# Patient Record
Sex: MALE | Race: WHITE | Employment: UNEMPLOYED | URBAN - METROPOLITAN AREA
[De-identification: names, ages, dates, MRNs, and addresses within clinical notes are randomized per-mention and may not be internally consistent; named-entity substitution may affect disease eponyms.]

---

## 2018-01-01 ENCOUNTER — TELEPHONE (OUTPATIENT)
Dept: CARDIOLOGY CLINIC | Age: 48
End: 2018-01-01

## 2018-01-01 ENCOUNTER — HOSPITAL ENCOUNTER (OUTPATIENT)
Dept: CARDIAC REHAB | Age: 48
Setting detail: THERAPIES SERIES
Discharge: HOME | End: 2018-09-27
Payer: MEDICARE

## 2018-01-01 ENCOUNTER — OFFICE VISIT (OUTPATIENT)
Dept: CARDIOLOGY CLINIC | Age: 48
End: 2018-01-01

## 2018-01-01 ENCOUNTER — HOSPITAL ENCOUNTER (OUTPATIENT)
Dept: OTHER | Age: 48
Discharge: HOME | End: 2018-09-05
Attending: INTERNAL MEDICINE | Admitting: INTERNAL MEDICINE

## 2018-01-01 VITALS
SYSTOLIC BLOOD PRESSURE: 130 MMHG | HEIGHT: 72 IN | BODY MASS INDEX: 28.55 KG/M2 | OXYGEN SATURATION: 96 % | DIASTOLIC BLOOD PRESSURE: 86 MMHG | WEIGHT: 210.8 LBS | HEART RATE: 93 BPM

## 2018-01-01 DIAGNOSIS — I21.4 NSTEMI (NON-ST ELEVATED MYOCARDIAL INFARCTION) (HCC): Primary | ICD-10-CM

## 2018-01-01 LAB
FERRITIN: 1197 NG/ML (ref 30–400)
IRON SATURATION: 25 % (ref 20–50)
IRON: 62 UG/DL (ref 59–158)
TOTAL IRON BINDING CAPACITY: 252 UG/DL (ref 260–445)

## 2018-01-01 PROCEDURE — G8598 ASA/ANTIPLAT THER USED: HCPCS | Performed by: NURSE PRACTITIONER

## 2018-01-01 PROCEDURE — G8419 CALC BMI OUT NRM PARAM NOF/U: HCPCS | Performed by: NURSE PRACTITIONER

## 2018-01-01 PROCEDURE — G8427 DOCREV CUR MEDS BY ELIG CLIN: HCPCS | Performed by: NURSE PRACTITIONER

## 2018-01-01 PROCEDURE — 1111F DSCHRG MED/CURRENT MED MERGE: CPT | Performed by: NURSE PRACTITIONER

## 2018-01-01 PROCEDURE — 99214 OFFICE O/P EST MOD 30 MIN: CPT | Performed by: NURSE PRACTITIONER

## 2018-01-01 PROCEDURE — 3017F COLORECTAL CA SCREEN DOC REV: CPT | Performed by: NURSE PRACTITIONER

## 2018-01-01 PROCEDURE — 1036F TOBACCO NON-USER: CPT | Performed by: NURSE PRACTITIONER

## 2018-08-13 PROBLEM — E11.9 TYPE 2 DIABETES MELLITUS (HCC): Status: ACTIVE | Noted: 2018-01-01

## 2018-08-13 PROBLEM — I10 ESSENTIAL (PRIMARY) HYPERTENSION: Status: ACTIVE | Noted: 2018-01-01

## 2018-08-13 PROBLEM — Z99.2 ESRD ON PERITONEAL DIALYSIS (HCC): Status: ACTIVE | Noted: 2018-01-01

## 2018-08-13 PROBLEM — N18.6 ESRD ON PERITONEAL DIALYSIS (HCC): Status: ACTIVE | Noted: 2018-01-01

## 2018-08-13 PROBLEM — I21.4 NSTEMI (NON-ST ELEVATED MYOCARDIAL INFARCTION) (HCC): Status: ACTIVE | Noted: 2018-01-01

## 2018-08-14 PROBLEM — E11.65 POORLY CONTROLLED TYPE 2 DIABETES MELLITUS (HCC): Status: ACTIVE | Noted: 2018-01-01

## 2018-08-14 PROBLEM — E87.70 FLUID OVERLOAD: Status: ACTIVE | Noted: 2018-01-01

## 2018-08-15 PROBLEM — Z71.89 DIABETES EDUCATION, ENCOUNTER FOR: Status: ACTIVE | Noted: 2018-01-01

## 2019-01-01 ENCOUNTER — HOSPITAL ENCOUNTER (INPATIENT)
Age: 49
LOS: 3 days | Discharge: HOME | DRG: 432 | End: 2019-08-05
Attending: EMERGENCY MEDICINE | Admitting: FAMILY MEDICINE
Payer: COMMERCIAL

## 2019-01-01 ENCOUNTER — APPOINTMENT (OUTPATIENT)
Dept: INTERVENTIONAL RADIOLOGY/VASCULAR | Age: 49
DRG: 432 | End: 2019-01-01
Payer: COMMERCIAL

## 2019-01-01 ENCOUNTER — APPOINTMENT (OUTPATIENT)
Dept: CT IMAGING | Age: 49
DRG: 432 | End: 2019-01-01
Payer: COMMERCIAL

## 2019-01-01 ENCOUNTER — HOSPITAL ENCOUNTER (EMERGENCY)
Age: 49
End: 2019-08-05
Attending: EMERGENCY MEDICINE
Payer: COMMERCIAL

## 2019-01-01 ENCOUNTER — APPOINTMENT (OUTPATIENT)
Dept: GENERAL RADIOLOGY | Age: 49
DRG: 432 | End: 2019-01-01
Payer: COMMERCIAL

## 2019-01-01 ENCOUNTER — OFFICE VISIT (OUTPATIENT)
Dept: CARDIOLOGY CLINIC | Age: 49
End: 2019-01-01
Payer: COMMERCIAL

## 2019-01-01 ENCOUNTER — TELEPHONE (OUTPATIENT)
Dept: CARDIOLOGY CLINIC | Age: 49
End: 2019-01-01

## 2019-01-01 VITALS
BODY MASS INDEX: 33.09 KG/M2 | OXYGEN SATURATION: 94 % | HEIGHT: 70 IN | DIASTOLIC BLOOD PRESSURE: 70 MMHG | RESPIRATION RATE: 16 BRPM | WEIGHT: 231.1 LBS | TEMPERATURE: 96.9 F | SYSTOLIC BLOOD PRESSURE: 118 MMHG | HEART RATE: 86 BPM

## 2019-01-01 VITALS
HEART RATE: 60 BPM | DIASTOLIC BLOOD PRESSURE: 62 MMHG | BODY MASS INDEX: 35.07 KG/M2 | HEIGHT: 70 IN | WEIGHT: 245 LBS | SYSTOLIC BLOOD PRESSURE: 106 MMHG

## 2019-01-01 VITALS — HEIGHT: 70 IN | WEIGHT: 231 LBS | BODY MASS INDEX: 33.07 KG/M2

## 2019-01-01 DIAGNOSIS — J90 PLEURAL EFFUSION ON RIGHT: ICD-10-CM

## 2019-01-01 DIAGNOSIS — I46.9 CARDIAC ARREST (HCC): Primary | ICD-10-CM

## 2019-01-01 DIAGNOSIS — N18.6 END-STAGE RENAL DISEASE ON HEMODIALYSIS (HCC): ICD-10-CM

## 2019-01-01 DIAGNOSIS — R18.8 OTHER ASCITES: ICD-10-CM

## 2019-01-01 DIAGNOSIS — I10 ESSENTIAL HYPERTENSION: Primary | ICD-10-CM

## 2019-01-01 DIAGNOSIS — E78.2 MIXED HYPERLIPIDEMIA: ICD-10-CM

## 2019-01-01 DIAGNOSIS — K76.6 PORTAL HYPERTENSION (HCC): ICD-10-CM

## 2019-01-01 DIAGNOSIS — I25.10 CORONARY ARTERY DISEASE DUE TO LIPID RICH PLAQUE: ICD-10-CM

## 2019-01-01 DIAGNOSIS — Z98.890 STATUS POST THORACENTESIS: ICD-10-CM

## 2019-01-01 DIAGNOSIS — Z99.2 END-STAGE RENAL DISEASE ON HEMODIALYSIS (HCC): ICD-10-CM

## 2019-01-01 DIAGNOSIS — R10.9 ABDOMINAL PAIN, UNSPECIFIED ABDOMINAL LOCATION: Primary | ICD-10-CM

## 2019-01-01 DIAGNOSIS — I25.83 CORONARY ARTERY DISEASE DUE TO LIPID RICH PLAQUE: ICD-10-CM

## 2019-01-01 LAB
A/G RATIO: 0.8 (ref 1.1–2.2)
A/G RATIO: 0.9 (ref 1.1–2.2)
ALBUMIN SERPL-MCNC: 3.3 G/DL (ref 3.4–5)
ALBUMIN SERPL-MCNC: 3.4 G/DL (ref 3.4–5)
ALP BLD-CCNC: 101 U/L (ref 40–129)
ALP BLD-CCNC: 103 U/L (ref 40–129)
ALT SERPL-CCNC: 10 U/L (ref 10–40)
ALT SERPL-CCNC: 12 U/L (ref 10–40)
ANION GAP SERPL CALCULATED.3IONS-SCNC: 14 MMOL/L (ref 3–16)
ANION GAP SERPL CALCULATED.3IONS-SCNC: 16 MMOL/L (ref 3–16)
ANION GAP SERPL CALCULATED.3IONS-SCNC: 16 MMOL/L (ref 3–16)
ANION GAP SERPL CALCULATED.3IONS-SCNC: 20 MMOL/L (ref 3–16)
APPEARANCE FLUID: NORMAL
APTT: 31.6 SEC (ref 26–36)
AST SERPL-CCNC: 10 U/L (ref 15–37)
AST SERPL-CCNC: 11 U/L (ref 15–37)
BASO FLUID: 1 %
BASOPHILS ABSOLUTE: 0.1 K/UL (ref 0–0.2)
BASOPHILS RELATIVE PERCENT: 2 %
BILIRUB SERPL-MCNC: 0.8 MG/DL (ref 0–1)
BILIRUB SERPL-MCNC: 0.9 MG/DL (ref 0–1)
BODY FLUID CULTURE, STERILE: NORMAL
BUN BLDV-MCNC: 39 MG/DL (ref 7–20)
BUN BLDV-MCNC: 40 MG/DL (ref 7–20)
BUN BLDV-MCNC: 51 MG/DL (ref 7–20)
BUN BLDV-MCNC: 53 MG/DL (ref 7–20)
CALCIUM SERPL-MCNC: 8.5 MG/DL (ref 8.3–10.6)
CALCIUM SERPL-MCNC: 8.6 MG/DL (ref 8.3–10.6)
CALCIUM SERPL-MCNC: 8.7 MG/DL (ref 8.3–10.6)
CALCIUM SERPL-MCNC: 9 MG/DL (ref 8.3–10.6)
CELL COUNT FLUID TYPE: NORMAL
CHLORIDE BLD-SCNC: 90 MMOL/L (ref 99–110)
CHLORIDE BLD-SCNC: 90 MMOL/L (ref 99–110)
CHLORIDE BLD-SCNC: 92 MMOL/L (ref 99–110)
CHLORIDE BLD-SCNC: 95 MMOL/L (ref 99–110)
CLOT EVALUATION: NORMAL
CO2: 24 MMOL/L (ref 21–32)
CO2: 24 MMOL/L (ref 21–32)
CO2: 26 MMOL/L (ref 21–32)
CO2: 27 MMOL/L (ref 21–32)
COLOR FLUID: YELLOW
CREAT SERPL-MCNC: 7.1 MG/DL (ref 0.9–1.3)
CREAT SERPL-MCNC: 7.9 MG/DL (ref 0.9–1.3)
CREAT SERPL-MCNC: 8.1 MG/DL (ref 0.9–1.3)
CREAT SERPL-MCNC: 9.2 MG/DL (ref 0.9–1.3)
EKG ATRIAL RATE: 86 BPM
EKG DIAGNOSIS: NORMAL
EKG P AXIS: 39 DEGREES
EKG P-R INTERVAL: 158 MS
EKG Q-T INTERVAL: 370 MS
EKG QRS DURATION: 82 MS
EKG QTC CALCULATION (BAZETT): 442 MS
EKG R AXIS: -2 DEGREES
EKG T AXIS: 31 DEGREES
EKG VENTRICULAR RATE: 86 BPM
EOSINOPHILS ABSOLUTE: 0.2 K/UL (ref 0–0.6)
EOSINOPHILS RELATIVE PERCENT: 3.3 %
ESTIMATED AVERAGE GLUCOSE: 125.5 MG/DL
FLUID PATH CONSULT: YES
FLUID TYPE: NORMAL
GFR AFRICAN AMERICAN: 10
GFR AFRICAN AMERICAN: 7
GFR AFRICAN AMERICAN: 9
GFR AFRICAN AMERICAN: 9
GFR NON-AFRICAN AMERICAN: 6
GFR NON-AFRICAN AMERICAN: 7
GFR NON-AFRICAN AMERICAN: 7
GFR NON-AFRICAN AMERICAN: 8
GLOBULIN: 3.6 G/DL
GLOBULIN: 4 G/DL
GLUCOSE BLD-MCNC: 104 MG/DL (ref 70–99)
GLUCOSE BLD-MCNC: 108 MG/DL (ref 70–99)
GLUCOSE BLD-MCNC: 109 MG/DL (ref 70–99)
GLUCOSE BLD-MCNC: 113 MG/DL (ref 70–99)
GLUCOSE BLD-MCNC: 123 MG/DL (ref 70–99)
GLUCOSE BLD-MCNC: 124 MG/DL (ref 70–99)
GLUCOSE BLD-MCNC: 128 MG/DL (ref 70–99)
GLUCOSE BLD-MCNC: 133 MG/DL (ref 70–99)
GLUCOSE BLD-MCNC: 146 MG/DL (ref 70–99)
GLUCOSE BLD-MCNC: 161 MG/DL (ref 70–99)
GLUCOSE BLD-MCNC: 171 MG/DL (ref 70–99)
GLUCOSE BLD-MCNC: 182 MG/DL (ref 70–99)
GLUCOSE BLD-MCNC: 196 MG/DL (ref 70–99)
GLUCOSE BLD-MCNC: 82 MG/DL (ref 70–99)
GLUCOSE BLD-MCNC: 88 MG/DL (ref 70–99)
GLUCOSE BLD-MCNC: 98 MG/DL (ref 70–99)
GLUCOSE BLD-MCNC: 99 MG/DL (ref 70–99)
GLUCOSE, FLUID: 113 MG/DL
GRAM STAIN RESULT: NORMAL
HBA1C MFR BLD: 6 %
HCT VFR BLD CALC: 33.2 % (ref 40.5–52.5)
HCT VFR BLD CALC: 34.5 % (ref 40.5–52.5)
HCT VFR BLD CALC: 34.7 % (ref 40.5–52.5)
HCT VFR BLD CALC: 35.5 % (ref 40.5–52.5)
HEMOGLOBIN: 10.5 G/DL (ref 13.5–17.5)
HEMOGLOBIN: 10.6 G/DL (ref 13.5–17.5)
HEMOGLOBIN: 11 G/DL (ref 13.5–17.5)
HEMOGLOBIN: 11 G/DL (ref 13.5–17.5)
INR BLD: 1.25 (ref 0.86–1.14)
LACTIC ACID, SEPSIS: 0.7 MMOL/L (ref 0.4–1.9)
LD, FLUID: 91 U/L
LIPASE: 24 U/L (ref 13–60)
LYMPHOCYTES ABSOLUTE: 0.8 K/UL (ref 1–5.1)
LYMPHOCYTES RELATIVE PERCENT: 12 %
LYMPHOCYTES, BODY FLUID: 50 %
MACROPHAGE FLUID: 33 %
MCH RBC QN AUTO: 27.5 PG (ref 26–34)
MCH RBC QN AUTO: 27.6 PG (ref 26–34)
MCH RBC QN AUTO: 27.9 PG (ref 26–34)
MCH RBC QN AUTO: 28.3 PG (ref 26–34)
MCHC RBC AUTO-ENTMCNC: 30.6 G/DL (ref 31–36)
MCHC RBC AUTO-ENTMCNC: 31.1 G/DL (ref 31–36)
MCHC RBC AUTO-ENTMCNC: 31.5 G/DL (ref 31–36)
MCHC RBC AUTO-ENTMCNC: 31.8 G/DL (ref 31–36)
MCV RBC AUTO: 88.7 FL (ref 80–100)
MCV RBC AUTO: 88.9 FL (ref 80–100)
MCV RBC AUTO: 89 FL (ref 80–100)
MCV RBC AUTO: 90.1 FL (ref 80–100)
MONOCYTE, FLUID: 13 %
MONOCYTES ABSOLUTE: 0.7 K/UL (ref 0–1.3)
MONOCYTES RELATIVE PERCENT: 10.1 %
NEUTROPHIL, FLUID: 3 %
NEUTROPHILS ABSOLUTE: 4.8 K/UL (ref 1.7–7.7)
NEUTROPHILS RELATIVE PERCENT: 72.6 %
NUCLEATED CELLS FLUID: 159 /CUMM
NUMBER OF CELLS COUNTED FLUID: 100
PDW BLD-RTO: 19.5 % (ref 12.4–15.4)
PDW BLD-RTO: 19.7 % (ref 12.4–15.4)
PERFORMED ON: ABNORMAL
PERFORMED ON: NORMAL
PHOSPHORUS: 9.5 MG/DL (ref 2.5–4.9)
PLATELET # BLD: 135 K/UL (ref 135–450)
PLATELET # BLD: 141 K/UL (ref 135–450)
PLATELET # BLD: 156 K/UL (ref 135–450)
PLATELET # BLD: 172 K/UL (ref 135–450)
PMV BLD AUTO: 8.7 FL (ref 5–10.5)
PMV BLD AUTO: 8.9 FL (ref 5–10.5)
PMV BLD AUTO: 9.3 FL (ref 5–10.5)
PMV BLD AUTO: 9.3 FL (ref 5–10.5)
POTASSIUM REFLEX MAGNESIUM: 5 MMOL/L (ref 3.5–5.1)
POTASSIUM REFLEX MAGNESIUM: 5.8 MMOL/L (ref 3.5–5.1)
POTASSIUM SERPL-SCNC: 5.2 MMOL/L (ref 3.5–5.1)
POTASSIUM SERPL-SCNC: 5.4 MMOL/L (ref 3.5–5.1)
PRO-BNP: ABNORMAL PG/ML (ref 0–124)
PROTEIN FLUID: 3.7 G/DL
PROTHROMBIN TIME: 14.2 SEC (ref 9.8–13)
RBC # BLD: 3.75 M/UL (ref 4.2–5.9)
RBC # BLD: 3.84 M/UL (ref 4.2–5.9)
RBC # BLD: 3.9 M/UL (ref 4.2–5.9)
RBC # BLD: 3.99 M/UL (ref 4.2–5.9)
RBC FLUID: 1500 /CUMM
SODIUM BLD-SCNC: 130 MMOL/L (ref 136–145)
SODIUM BLD-SCNC: 131 MMOL/L (ref 136–145)
SODIUM BLD-SCNC: 136 MMOL/L (ref 136–145)
SODIUM BLD-SCNC: 137 MMOL/L (ref 136–145)
TOTAL PROTEIN: 7 G/DL (ref 6.4–8.2)
TOTAL PROTEIN: 7.3 G/DL (ref 6.4–8.2)
WBC # BLD: 5.6 K/UL (ref 4–11)
WBC # BLD: 5.8 K/UL (ref 4–11)
WBC # BLD: 6.7 K/UL (ref 4–11)
WBC # BLD: 6.8 K/UL (ref 4–11)

## 2019-01-01 PROCEDURE — 2580000003 HC RX 258: Performed by: INTERNAL MEDICINE

## 2019-01-01 PROCEDURE — 6360000002 HC RX W HCPCS: Performed by: INTERNAL MEDICINE

## 2019-01-01 PROCEDURE — 99285 EMERGENCY DEPT VISIT HI MDM: CPT

## 2019-01-01 PROCEDURE — 99214 OFFICE O/P EST MOD 30 MIN: CPT | Performed by: NURSE PRACTITIONER

## 2019-01-01 PROCEDURE — 0W993ZZ DRAINAGE OF RIGHT PLEURAL CAVITY, PERCUTANEOUS APPROACH: ICD-10-PCS | Performed by: RADIOLOGY

## 2019-01-01 PROCEDURE — 74177 CT ABD & PELVIS W/CONTRAST: CPT

## 2019-01-01 PROCEDURE — 94760 N-INVAS EAR/PLS OXIMETRY 1: CPT

## 2019-01-01 PROCEDURE — 5A1D70Z PERFORMANCE OF URINARY FILTRATION, INTERMITTENT, LESS THAN 6 HOURS PER DAY: ICD-10-PCS | Performed by: INTERNAL MEDICINE

## 2019-01-01 PROCEDURE — 84157 ASSAY OF PROTEIN OTHER: CPT

## 2019-01-01 PROCEDURE — 6370000000 HC RX 637 (ALT 250 FOR IP): Performed by: INTERNAL MEDICINE

## 2019-01-01 PROCEDURE — 36415 COLL VENOUS BLD VENIPUNCTURE: CPT

## 2019-01-01 PROCEDURE — 87205 SMEAR GRAM STAIN: CPT

## 2019-01-01 PROCEDURE — 96374 THER/PROPH/DIAG INJ IV PUSH: CPT

## 2019-01-01 PROCEDURE — 83880 ASSAY OF NATRIURETIC PEPTIDE: CPT

## 2019-01-01 PROCEDURE — 93010 ELECTROCARDIOGRAM REPORT: CPT | Performed by: INTERNAL MEDICINE

## 2019-01-01 PROCEDURE — 83615 LACTATE (LD) (LDH) ENZYME: CPT

## 2019-01-01 PROCEDURE — 93005 ELECTROCARDIOGRAM TRACING: CPT | Performed by: PHYSICIAN ASSISTANT

## 2019-01-01 PROCEDURE — 83036 HEMOGLOBIN GLYCOSYLATED A1C: CPT

## 2019-01-01 PROCEDURE — 49083 ABD PARACENTESIS W/IMAGING: CPT

## 2019-01-01 PROCEDURE — 85730 THROMBOPLASTIN TIME PARTIAL: CPT

## 2019-01-01 PROCEDURE — 80053 COMPREHEN METABOLIC PANEL: CPT

## 2019-01-01 PROCEDURE — 80048 BASIC METABOLIC PNL TOTAL CA: CPT

## 2019-01-01 PROCEDURE — C1729 CATH, DRAINAGE: HCPCS

## 2019-01-01 PROCEDURE — 85025 COMPLETE CBC W/AUTO DIFF WBC: CPT

## 2019-01-01 PROCEDURE — 82945 GLUCOSE OTHER FLUID: CPT

## 2019-01-01 PROCEDURE — 1200000000 HC SEMI PRIVATE

## 2019-01-01 PROCEDURE — 87070 CULTURE OTHR SPECIMN AEROBIC: CPT

## 2019-01-01 PROCEDURE — 83690 ASSAY OF LIPASE: CPT

## 2019-01-01 PROCEDURE — 6360000002 HC RX W HCPCS: Performed by: PHYSICIAN ASSISTANT

## 2019-01-01 PROCEDURE — 85027 COMPLETE CBC AUTOMATED: CPT

## 2019-01-01 PROCEDURE — 90935 HEMODIALYSIS ONE EVALUATION: CPT

## 2019-01-01 PROCEDURE — 84100 ASSAY OF PHOSPHORUS: CPT

## 2019-01-01 PROCEDURE — 83605 ASSAY OF LACTIC ACID: CPT

## 2019-01-01 PROCEDURE — 71045 X-RAY EXAM CHEST 1 VIEW: CPT

## 2019-01-01 PROCEDURE — 6360000004 HC RX CONTRAST MEDICATION: Performed by: PHYSICIAN ASSISTANT

## 2019-01-01 PROCEDURE — 96375 TX/PRO/DX INJ NEW DRUG ADDON: CPT

## 2019-01-01 PROCEDURE — 87015 SPECIMEN INFECT AGNT CONCNTJ: CPT

## 2019-01-01 PROCEDURE — 85610 PROTHROMBIN TIME: CPT

## 2019-01-01 PROCEDURE — 89051 BODY FLUID CELL COUNT: CPT

## 2019-01-01 PROCEDURE — 87040 BLOOD CULTURE FOR BACTERIA: CPT

## 2019-01-01 PROCEDURE — 2500000003 HC RX 250 WO HCPCS: Performed by: INTERNAL MEDICINE

## 2019-01-01 PROCEDURE — 0W9G3ZZ DRAINAGE OF PERITONEAL CAVITY, PERCUTANEOUS APPROACH: ICD-10-PCS | Performed by: RADIOLOGY

## 2019-01-01 PROCEDURE — 32555 ASPIRATE PLEURA W/ IMAGING: CPT

## 2019-01-01 RX ORDER — CLOPIDOGREL BISULFATE 75 MG/1
75 TABLET ORAL DAILY
Qty: 30 TABLET | Refills: 3 | Status: SHIPPED | OUTPATIENT
Start: 2019-01-01

## 2019-01-01 RX ORDER — DOCUSATE SODIUM 100 MG/1
100 CAPSULE, LIQUID FILLED ORAL 2 TIMES DAILY
Status: DISCONTINUED | OUTPATIENT
Start: 2019-01-01 | End: 2019-01-01 | Stop reason: HOSPADM

## 2019-01-01 RX ORDER — SODIUM CHLORIDE 0.9 % (FLUSH) 0.9 %
10 SYRINGE (ML) INJECTION EVERY 12 HOURS SCHEDULED
Status: DISCONTINUED | OUTPATIENT
Start: 2019-01-01 | End: 2019-01-01 | Stop reason: HOSPADM

## 2019-01-01 RX ORDER — DEXTROSE MONOHYDRATE 50 MG/ML
100 INJECTION, SOLUTION INTRAVENOUS PRN
Status: DISCONTINUED | OUTPATIENT
Start: 2019-01-01 | End: 2019-01-01 | Stop reason: HOSPADM

## 2019-01-01 RX ORDER — ATORVASTATIN CALCIUM 20 MG/1
20 TABLET, FILM COATED ORAL NIGHTLY
Status: DISCONTINUED | OUTPATIENT
Start: 2019-01-01 | End: 2019-01-01 | Stop reason: HOSPADM

## 2019-01-01 RX ORDER — ONDANSETRON 2 MG/ML
4 INJECTION INTRAMUSCULAR; INTRAVENOUS EVERY 6 HOURS PRN
Status: DISCONTINUED | OUTPATIENT
Start: 2019-01-01 | End: 2019-01-01 | Stop reason: HOSPADM

## 2019-01-01 RX ORDER — NICOTINE POLACRILEX 4 MG
15 LOZENGE BUCCAL PRN
Status: DISCONTINUED | OUTPATIENT
Start: 2019-01-01 | End: 2019-01-01 | Stop reason: HOSPADM

## 2019-01-01 RX ORDER — SODIUM CHLORIDE 0.9 % (FLUSH) 0.9 %
10 SYRINGE (ML) INJECTION PRN
Status: DISCONTINUED | OUTPATIENT
Start: 2019-01-01 | End: 2019-01-01 | Stop reason: HOSPADM

## 2019-01-01 RX ORDER — LIDOCAINE HYDROCHLORIDE 10 MG/ML
10 INJECTION, SOLUTION EPIDURAL; INFILTRATION; INTRACAUDAL; PERINEURAL ONCE
Status: COMPLETED | OUTPATIENT
Start: 2019-01-01 | End: 2019-01-01

## 2019-01-01 RX ORDER — GABAPENTIN 400 MG/1
400 CAPSULE ORAL 2 TIMES DAILY
Status: DISCONTINUED | OUTPATIENT
Start: 2019-01-01 | End: 2019-01-01 | Stop reason: HOSPADM

## 2019-01-01 RX ORDER — ZOLPIDEM TARTRATE 5 MG/1
5 TABLET ORAL NIGHTLY PRN
Status: DISCONTINUED | OUTPATIENT
Start: 2019-01-01 | End: 2019-01-01 | Stop reason: HOSPADM

## 2019-01-01 RX ORDER — ONDANSETRON 2 MG/ML
4 INJECTION INTRAMUSCULAR; INTRAVENOUS ONCE
Status: COMPLETED | OUTPATIENT
Start: 2019-01-01 | End: 2019-01-01

## 2019-01-01 RX ORDER — ASPIRIN 81 MG/1
81 TABLET, CHEWABLE ORAL DAILY
Status: DISCONTINUED | OUTPATIENT
Start: 2019-01-01 | End: 2019-01-01 | Stop reason: HOSPADM

## 2019-01-01 RX ORDER — DEXTROSE MONOHYDRATE 25 G/50ML
12.5 INJECTION, SOLUTION INTRAVENOUS PRN
Status: DISCONTINUED | OUTPATIENT
Start: 2019-01-01 | End: 2019-01-01 | Stop reason: HOSPADM

## 2019-01-01 RX ORDER — ZOLPIDEM TARTRATE 5 MG/1
5 TABLET ORAL NIGHTLY PRN
COMMUNITY

## 2019-01-01 RX ORDER — PANTOPRAZOLE SODIUM 40 MG/1
40 TABLET, DELAYED RELEASE ORAL
Status: DISCONTINUED | OUTPATIENT
Start: 2019-01-01 | End: 2019-01-01 | Stop reason: HOSPADM

## 2019-01-01 RX ORDER — MORPHINE SULFATE 2 MG/ML
2 INJECTION, SOLUTION INTRAMUSCULAR; INTRAVENOUS EVERY 4 HOURS PRN
Status: DISCONTINUED | OUTPATIENT
Start: 2019-01-01 | End: 2019-01-01 | Stop reason: HOSPADM

## 2019-01-01 RX ORDER — ATORVASTATIN CALCIUM 20 MG/1
20 TABLET, FILM COATED ORAL NIGHTLY
Qty: 30 TABLET | Refills: 3 | Status: SHIPPED | OUTPATIENT
Start: 2019-01-01

## 2019-01-01 RX ORDER — MORPHINE SULFATE 4 MG/ML
4 INJECTION, SOLUTION INTRAMUSCULAR; INTRAVENOUS ONCE
Status: COMPLETED | OUTPATIENT
Start: 2019-01-01 | End: 2019-01-01

## 2019-01-01 RX ORDER — CLOPIDOGREL BISULFATE 75 MG/1
75 TABLET ORAL DAILY
Status: DISCONTINUED | OUTPATIENT
Start: 2019-01-01 | End: 2019-01-01 | Stop reason: HOSPADM

## 2019-01-01 RX ORDER — HEPARIN SODIUM 5000 [USP'U]/ML
5000 INJECTION, SOLUTION INTRAVENOUS; SUBCUTANEOUS EVERY 8 HOURS SCHEDULED
Status: DISCONTINUED | OUTPATIENT
Start: 2019-01-01 | End: 2019-01-01 | Stop reason: HOSPADM

## 2019-01-01 RX ORDER — HYDROCODONE BITARTRATE AND ACETAMINOPHEN 5; 325 MG/1; MG/1
1 TABLET ORAL EVERY 6 HOURS PRN
Status: DISCONTINUED | OUTPATIENT
Start: 2019-01-01 | End: 2019-01-01 | Stop reason: HOSPADM

## 2019-01-01 RX ADMIN — MORPHINE SULFATE 2 MG: 2 INJECTION, SOLUTION INTRAMUSCULAR; INTRAVENOUS at 22:38

## 2019-01-01 RX ADMIN — DOCUSATE SODIUM 100 MG: 100 CAPSULE, LIQUID FILLED ORAL at 08:51

## 2019-01-01 RX ADMIN — HEPARIN SODIUM 5000 UNITS: 5000 INJECTION INTRAVENOUS; SUBCUTANEOUS at 22:36

## 2019-01-01 RX ADMIN — HYDROCODONE BITARTRATE AND ACETAMINOPHEN 1 TABLET: 5; 325 TABLET ORAL at 15:17

## 2019-01-01 RX ADMIN — MORPHINE SULFATE 2 MG: 2 INJECTION, SOLUTION INTRAMUSCULAR; INTRAVENOUS at 22:37

## 2019-01-01 RX ADMIN — ASPIRIN 81 MG 81 MG: 81 TABLET ORAL at 08:51

## 2019-01-01 RX ADMIN — MORPHINE SULFATE 2 MG: 2 INJECTION, SOLUTION INTRAMUSCULAR; INTRAVENOUS at 05:21

## 2019-01-01 RX ADMIN — GABAPENTIN 400 MG: 400 CAPSULE ORAL at 22:27

## 2019-01-01 RX ADMIN — GABAPENTIN 400 MG: 400 CAPSULE ORAL at 12:29

## 2019-01-01 RX ADMIN — DOCUSATE SODIUM 100 MG: 100 CAPSULE, LIQUID FILLED ORAL at 22:28

## 2019-01-01 RX ADMIN — CLOPIDOGREL 75 MG: 75 TABLET, FILM COATED ORAL at 22:36

## 2019-01-01 RX ADMIN — ATORVASTATIN CALCIUM 20 MG: 20 TABLET, FILM COATED ORAL at 22:28

## 2019-01-01 RX ADMIN — HEPARIN SODIUM 5000 UNITS: 5000 INJECTION INTRAVENOUS; SUBCUTANEOUS at 14:37

## 2019-01-01 RX ADMIN — GABAPENTIN 400 MG: 400 CAPSULE ORAL at 21:35

## 2019-01-01 RX ADMIN — MORPHINE SULFATE 2 MG: 2 INJECTION, SOLUTION INTRAMUSCULAR; INTRAVENOUS at 03:24

## 2019-01-01 RX ADMIN — PANTOPRAZOLE SODIUM 40 MG: 40 TABLET, DELAYED RELEASE ORAL at 06:08

## 2019-01-01 RX ADMIN — MORPHINE SULFATE 4 MG: 4 INJECTION INTRAVENOUS at 14:39

## 2019-01-01 RX ADMIN — DOCUSATE SODIUM 100 MG: 100 CAPSULE, LIQUID FILLED ORAL at 21:35

## 2019-01-01 RX ADMIN — HEPARIN SODIUM 5000 UNITS: 5000 INJECTION INTRAVENOUS; SUBCUTANEOUS at 15:10

## 2019-01-01 RX ADMIN — CLOPIDOGREL 75 MG: 75 TABLET, FILM COATED ORAL at 12:22

## 2019-01-01 RX ADMIN — MORPHINE SULFATE 2 MG: 2 INJECTION, SOLUTION INTRAMUSCULAR; INTRAVENOUS at 12:23

## 2019-01-01 RX ADMIN — MORPHINE SULFATE 2 MG: 2 INJECTION, SOLUTION INTRAMUSCULAR; INTRAVENOUS at 09:04

## 2019-01-01 RX ADMIN — HEPARIN SODIUM 5000 UNITS: 5000 INJECTION INTRAVENOUS; SUBCUTANEOUS at 22:29

## 2019-01-01 RX ADMIN — CLOPIDOGREL 75 MG: 75 TABLET, FILM COATED ORAL at 08:51

## 2019-01-01 RX ADMIN — IOPAMIDOL 75 ML: 755 INJECTION, SOLUTION INTRAVENOUS at 12:33

## 2019-01-01 RX ADMIN — LIDOCAINE HYDROCHLORIDE 10 ML: 10 INJECTION, SOLUTION EPIDURAL; INFILTRATION; INTRACAUDAL; PERINEURAL at 10:49

## 2019-01-01 RX ADMIN — MORPHINE SULFATE 2 MG: 2 INJECTION, SOLUTION INTRAMUSCULAR; INTRAVENOUS at 18:05

## 2019-01-01 RX ADMIN — ONDANSETRON 4 MG: 2 INJECTION INTRAMUSCULAR; INTRAVENOUS at 14:39

## 2019-01-01 RX ADMIN — INSULIN LISPRO 2 UNITS: 100 INJECTION, SOLUTION INTRAVENOUS; SUBCUTANEOUS at 17:40

## 2019-01-01 RX ADMIN — Medication 10 ML: at 12:22

## 2019-01-01 RX ADMIN — MORPHINE SULFATE 2 MG: 2 INJECTION, SOLUTION INTRAMUSCULAR; INTRAVENOUS at 17:05

## 2019-01-01 RX ADMIN — ASPIRIN 81 MG 81 MG: 81 TABLET ORAL at 22:36

## 2019-01-01 RX ADMIN — DOCUSATE SODIUM 100 MG: 100 CAPSULE, LIQUID FILLED ORAL at 22:36

## 2019-01-01 RX ADMIN — ATORVASTATIN CALCIUM 20 MG: 20 TABLET, FILM COATED ORAL at 22:36

## 2019-01-01 RX ADMIN — GABAPENTIN 400 MG: 400 CAPSULE ORAL at 12:23

## 2019-01-01 RX ADMIN — GABAPENTIN 400 MG: 400 CAPSULE ORAL at 22:36

## 2019-01-01 RX ADMIN — ONDANSETRON 4 MG: 2 INJECTION INTRAMUSCULAR; INTRAVENOUS at 03:24

## 2019-01-01 RX ADMIN — ASPIRIN 81 MG 81 MG: 81 TABLET ORAL at 12:23

## 2019-01-01 RX ADMIN — Medication 10 ML: at 22:37

## 2019-01-01 RX ADMIN — Medication 10 ML: at 08:52

## 2019-01-01 RX ADMIN — ZOLPIDEM TARTRATE 5 MG: 5 TABLET ORAL at 22:37

## 2019-01-01 RX ADMIN — HEPARIN SODIUM 5000 UNITS: 5000 INJECTION INTRAVENOUS; SUBCUTANEOUS at 06:08

## 2019-01-01 RX ADMIN — GABAPENTIN 400 MG: 400 CAPSULE ORAL at 08:51

## 2019-01-01 RX ADMIN — HEPARIN SODIUM 5000 UNITS: 5000 INJECTION INTRAVENOUS; SUBCUTANEOUS at 21:35

## 2019-01-01 RX ADMIN — Medication 10 ML: at 17:36

## 2019-01-01 RX ADMIN — ZOLPIDEM TARTRATE 5 MG: 5 TABLET ORAL at 22:28

## 2019-01-01 RX ADMIN — MORPHINE SULFATE 2 MG: 2 INJECTION, SOLUTION INTRAMUSCULAR; INTRAVENOUS at 21:35

## 2019-01-01 RX ADMIN — Medication 10 ML: at 21:35

## 2019-01-01 RX ADMIN — MORPHINE SULFATE 2 MG: 2 INJECTION, SOLUTION INTRAMUSCULAR; INTRAVENOUS at 17:34

## 2019-01-01 RX ADMIN — ATORVASTATIN CALCIUM 20 MG: 20 TABLET, FILM COATED ORAL at 21:35

## 2019-01-01 ASSESSMENT — PAIN DESCRIPTION - PAIN TYPE
TYPE: ACUTE PAIN
TYPE: ACUTE PAIN;CHRONIC PAIN
TYPE: CHRONIC PAIN;ACUTE PAIN
TYPE: ACUTE PAIN
TYPE: CHRONIC PAIN;ACUTE PAIN
TYPE: ACUTE PAIN
TYPE: ACUTE PAIN

## 2019-01-01 ASSESSMENT — ENCOUNTER SYMPTOMS
COUGH: 0
ABDOMINAL DISTENTION: 1
STRIDOR: 0
ABDOMINAL PAIN: 1
CHEST TIGHTNESS: 0
VOMITING: 0
DIARRHEA: 0
SHORTNESS OF BREATH: 1
WHEEZING: 0
BACK PAIN: 0
NAUSEA: 0

## 2019-01-01 ASSESSMENT — PAIN DESCRIPTION - LOCATION
LOCATION: ABDOMEN

## 2019-01-01 ASSESSMENT — PAIN DESCRIPTION - ORIENTATION
ORIENTATION: MID

## 2019-01-01 ASSESSMENT — PAIN SCALES - GENERAL
PAINLEVEL_OUTOF10: 7
PAINLEVEL_OUTOF10: 0
PAINLEVEL_OUTOF10: 6
PAINLEVEL_OUTOF10: 6
PAINLEVEL_OUTOF10: 7
PAINLEVEL_OUTOF10: 6
PAINLEVEL_OUTOF10: 7
PAINLEVEL_OUTOF10: 5
PAINLEVEL_OUTOF10: 0
PAINLEVEL_OUTOF10: 7
PAINLEVEL_OUTOF10: 0
PAINLEVEL_OUTOF10: 3
PAINLEVEL_OUTOF10: 8
PAINLEVEL_OUTOF10: 6
PAINLEVEL_OUTOF10: 6
PAINLEVEL_OUTOF10: 8
PAINLEVEL_OUTOF10: 5
PAINLEVEL_OUTOF10: 7

## 2019-02-18 NOTE — PROGRESS NOTES
[address]     Outpatient Follow Up Note    CHIEF COMPLAINT / HPI: Hospital Follow Up secondary to CAD/ hypertension/ ERSD/ and Hyperlipidemia     Hospital record has been reviewed  Hospital Course progressed as follows:     Patient admitted with SOB, chest discomfort and elevated troponin, Treated for NSTEMI, underwent cath showing multivessel disease but small distal targets. CTVS evaluated and felt medical management best option and not a surgical candidate. Asa, plavix, statin along with being continued on beta blocker. He also had 1/2 blood culture positive for gram positive rods which was bacillus probably contaminant. Patient does mention that he has not been taking insulin since on HD.     He does seem to have chronic thrombocytopenia. 12/7/17 labs at [address] [place] [address] showed platelet 906.      He was recently on PD which he did not tolerated and will be transitioned back to HD. Nephrology was consulted during the stay.  Alie Canela was discharge in stable condition. Staci Dyer is 46 y.o. male who presents today for a routine follow up after a recent hospitalization related to the above mentioned issues. Subjective:   Since the time of discharge, the patient admits their symptoms have improved. Currently the patient denies significant chest pain. There is no associated dyspnea on exertion. The patient is not experiencing palpitations. These symptoms are improving over the last few weeks. With regard to medication therapy the patient has been compliant with prescribed regimen. They have tolerated therapy to date.      Past Medical History:   Diagnosis Date    CHF (congestive heart failure) (HCC)     Chronic kidney disease     Diabetes mellitus ([facility]     Hypertension      Social History:    History   Smoking Status    Never Smoker   Smokeless Tobacco    Never Used     Current Medications:  Current Outpatient Prescriptions   Medication Sig Dispense Refill    aspirin 81 MG chewable PATIENT RESTING IN BED WITH EYES CLOSED. RESPIRATIONS EASY, NO S/S OF DISTRESS
NOTED. WILL MONITOR. CALL LIGHT LEFT IN REACH. tablet Take 1 tablet by mouth daily 30 tablet 3    atorvastatin (LIPITOR) 20 MG tablet Take 1 tablet by mouth nightly 30 tablet 3    clopidogrel (PLAVIX) 75 MG tablet Take 1 tablet by mouth daily 30 tablet 3    gabapentin (NEURONTIN) 400 MG capsule Take 400 mg by mouth. [place] hydrALAZINE (APRESOLINE) 50 MG tablet Take 50 mg by mouth       HYDROcodone-acetaminophen (NORCO) 5-325 MG per tablet Take 1-2 tablets by mouth. [place] metoprolol tartrate (LOPRESSOR) 25 MG tablet Take 100 mg by mouth      sevelamer (RENVELA) 800 MG tablet Take 800 mg by mouth      Insulin Detemir (LEVEMIR SC) 30 units      pantoprazole (PROTONIX) 40 MG tablet Protonix 40 mg tablet,delayed release   Take 1 tablet every day by oral route for 30 days. No current facility-administered medications for this visit. REVIEW OF SYSTEMS:   CONSTITUTIONAL: No major weight gain or loss, fatigue, weakness, night sweats or fever. There's been no change in energy level, sleep pattern, or activity level. HEENT: No new vision difficulties or ringing in the ears. RESPIRATORY: No new SOB, PND, orthopnea or cough. CARDIOVASCULAR: See HPI  GI: No nausea, vomiting, diarrhea, constipation, abdominal pain or changes in bowel habits. : No urinary frequency, urgency, incontinence hematuria or dysuria. SKIN: No cyanosis or skin lesions. MUSCULOSKELETAL: No new muscle or joint pain. NEUROLOGICAL: No syncope or TIA-like symptoms. PSYCHIATRIC: No anxiety, pain, insomnia or depression    Objective:   PHYSICAL EXAM:        VITALS:    Vitals:    09/07/18 1144   BP: 130/86   Pulse:    SpO2:          CONSTITUTIONAL: Cooperative, no apparent distress, and appears well nourished / developed  NEUROLOGIC:  Awake and orientated to person, place and time. PSYCH: Calm affect. SKIN: Warm and dry. HEENT: Sclera non-icteric, normocephalic, neck supple, no elevation of JVP, normal carotid pulses with no bruits and thyroid normal size.   LUNGS:  No consult.       Last ECHO: 8/15/18  Summary   Normal left ventricle size, wall thickness, and systolic function with an   estimated ejection fraction of 55-60%. No regional wall motion abnormalities   are seen.   The aortic valve is sclerotic.   Normal diastolic function.   Trivial tricuspid regurgitation.       Assessment:   CAD  8/14/18 cardiac angiogram; Multi vessel disease, EF 55%, plan : medications regimen due to small vessel and unable to bypass  On ASA, Plavix, Lopressor, and Lipitor  Patient denies any anginal symptoms  Plan: continue current medications    Hypertension  Today's B/P 130/86  Stable, continue current medications    Hyperlipidemia  On Lipitor 20 mg daily  8/14/18: HDL: 31, LDL: 48  Plan: continue current medication    CKD  Dialysis three times a week    Patient  is stable since hospital discharge. Plan:   Continue current medications  Lab slip given for CBC  Refer patient to cardiac rehab  Follow up in three months     I have addressed the patient's cardiac risk factors and adjusted pharmacologic treatment as needed. In addition, I have reinforced the need for patient directed risk factor modification. Further evaluation will be based upon the patient's clinical course and testing results. All questions and concerns were addressed to the patient/family. Alternatives to  treatment were discussed. The patient  currently  is not smoking. The risks related to smoking were reviewed with the patient. Recommend maintaining a smoke-free lifestyle. Products available for smoking cessation were discussed. Daily weight, low sodium diet were discussed. Patient instructed to call the office with a weight gain: > 3 lbs over night or 5 lbs in one week; swelling, SOB/orthopnea/PND    Dual Antiplatelet therapy has been prescribed for this patient. Education conducted on adverse reactions including bleeding was discussed. The patient verbalizes understanding.     Pt is on a BB  Pt is not on an

## 2019-04-09 NOTE — TELEPHONE ENCOUNTER
Per Sherice Sargent , I was advised to contact pt about an US-ABD report I was told that he needs an appointment to address.  Pt was transferred to scheduling to make the appointment

## 2019-04-16 PROBLEM — I25.83 CORONARY ARTERY DISEASE DUE TO LIPID RICH PLAQUE: Status: ACTIVE | Noted: 2019-01-01

## 2019-04-16 PROBLEM — E78.2 MIXED HYPERLIPIDEMIA: Status: ACTIVE | Noted: 2019-01-01

## 2019-04-16 PROBLEM — I25.10 CORONARY ARTERY DISEASE DUE TO LIPID RICH PLAQUE: Status: ACTIVE | Noted: 2019-01-01

## 2019-04-16 NOTE — PROGRESS NOTES
[facility]     Outpatient Follow Up Note    CHIEF COMPLAINT / HPI: Follow Up secondary to CAD/ hypertension/ ERSD/ and Hyperlipidemia     Hospital record has been reviewed  Hospital Course progressed as follows:     Patient admitted with SOB, chest discomfort and elevated troponin, Treated for NSTEMI, underwent cath showing multivessel disease but small distal targets. CTVS evaluated and felt medical management best option and not a surgical candidate. Asa, plavix, statin along with being continued on beta blocker. He also had 1/2 blood culture positive for gram positive rods which was bacillus probably contaminant. Patient does mention that he has not been taking insulin since on HD.     He does seem to have chronic thrombocytopenia. 12/7/17 labs at [address] [place] [address] showed platelet 526.      He was recently on PD which he did not tolerated and will be transitioned back to HD. Nephrology was consulted during the stay.  Isabel Elizabeth was discharge in stable condition. James Hussein is 44 y.o. male who presents today for a routine follow up related to the above mentioned issues. Subjective:   Since the time of last office visit, the patient admits their symptoms have improved. Currently the patient denies significant chest pain. There is no associated dyspnea on exertion. The patient is not experiencing palpitations. These symptoms are improving over the last few weeks. With regard to medication therapy the patient has been compliant with prescribed regimen. They have tolerated therapy to date.      Past Medical History:   Diagnosis Date    CHF (congestive heart failure) (HCC)     Chronic kidney disease     Diabetes mellitus ([facility]     Hypertension      Social History:    Social History     Tobacco Use   Smoking Status Never Smoker   Smokeless Tobacco Never Used     Current Medications:  Current Outpatient Medications   Medication Sig Dispense Refill    atorvastatin (LIPITOR) 20 MG tablet Take 1 are not engorged                 JVP less than 8 cm H2O                                                                              The carotid upstroke is normal in amplitude and contour without delay or bruit    ABDOMEN:  Normal bowel sounds, non-distended and non-tender to palpation   EXT: No edema, no calf tenderness. Pulses are present bilaterally. DATA:    Lab Results   Component Value Date    ALT 15 08/13/2018    AST 10 (L) 08/13/2018    ALKPHOS 71 08/13/2018    BILITOT 0.5 08/13/2018     Lab Results   Component Value Date    CREATININE 8.04 (H) 01/29/2019    BUN 51 (H) 01/29/2019     01/29/2019    K 5.0 01/29/2019     01/29/2019    CO2 23 01/29/2019     No results found for: TSH, B0RQSVZ, T7QNVCV, THYROIDAB  Lab Results   Component Value Date    WBC 8.5 01/29/2019    HGB 9.4 (L) 01/29/2019    HCT 29.2 (L) 01/29/2019    MCV 89.1 01/29/2019     01/29/2019     No components found for: CHLPL  Lab Results   Component Value Date    TRIG 57 08/14/2018     Lab Results   Component Value Date    HDL 31 (L) 08/14/2018     Lab Results   Component Value Date    LDLCALC 48 08/14/2018     Lab Results   Component Value Date    LABVLDL 11 08/14/2018     Radiology Review:  Pertinent images / reports were reviewed as a part of this visit and reveals the following:  Cardiac angiogram: 8/14/18  50% distal L main  95% mid LAD  70% ostial D1  99% ostial Cx  100% ostial OM1 with L-L collateral  90% mid PDA  Normal LV Fxn with EF 55-60%--EDP 25 mmHG     Very small distal targets. Will get CVTS consult.       Last ECHO: 8/15/18  Summary   Normal left ventricle size, wall thickness, and systolic function with an   estimated ejection fraction of 55-60%.  No regional wall motion abnormalities   are seen.   The aortic valve is sclerotic.   Normal diastolic function.   Trivial tricuspid regurgitation.       Assessment:   CAD  8/14/18 cardiac angiogram; Multi vessel disease, EF 55%, plan : medications regimen due to small vessel and unable to bypass  On ASA, Plavix, and Lipitor  Patient denies any anginal symptoms  Plan: continue current medications    Hypertension  Today's B/P 106/62  Stable, continue current medications    Hyperlipidemia  On Lipitor 20 mg daily  8/14/18: HDL: 31, LDL: 48  Plan: continue current medication    CKD  Dialysis three times a week    Patient  is stable since last office visit. Plan:   Continue current medications  Follow up in six months    I have addressed the patient's cardiac risk factors and adjusted pharmacologic treatment as needed. In addition, I have reinforced the need for patient directed risk factor modification. Further evaluation will be based upon the patient's clinical course and testing results. All questions and concerns were addressed to the patient/family. Alternatives to  treatment were discussed. The patient  currently  is not smoking. The risks related to smoking were reviewed with the patient. Recommend maintaining a smoke-free lifestyle. Products available for smoking cessation were discussed. Daily weight, low sodium diet were discussed. Patient instructed to call the office with a weight gain: > 3 lbs over night or 5 lbs in one week; swelling, SOB/orthopnea/PND    Dual Antiplatelet therapy has been prescribed for this patient. Education conducted on adverse reactions including bleeding was discussed. The patient verbalizes understanding. Pt is on a BB  Pt is not on an ace-i/ARB: due to ESRD  Pt is on a statin    Saturated fat diet discussed  Exercise program discussed    Thank you for allowing to us to participate in the care of Page Blum CNP

## 2019-08-02 PROBLEM — R18.8 ASCITES: Status: ACTIVE | Noted: 2019-01-01

## 2019-08-02 NOTE — ED NOTES
RN to pt room; pt a/o x 4, sitting up in bed with bed in low position, side rails up x 2, heart monitor placed on pt, b/p set to cycle. Pt IV established and blood work obtained. Pt states he is anuric, provider notified. Pt updated on plan of care, v/u. Will monitor.       Lyndsay Barnett RN  08/02/19 6563

## 2019-08-02 NOTE — ED NOTES
Consent signed for paracentesis and placed on pt's chart. Report called to 5T RN, v/u.; heart monitor placed on pt.  Pt taken to IR for procedure by RN, all pt belongings in [facility]  08/02/19 4590

## 2019-08-02 NOTE — CONSULTS
nephropathy associated with type 2 diabetes mellitus ([facility]    Acute renal failure ([facility] [address] [facility]    Weight loss counseling, encounter for    Mixed hyperlipidemia    Coronary artery disease due to lipid rich plaque    Ascites   : Other supportive care :   - Check daily renal function panel with electrolytes-phosphorus  - Strict monitoring of I/Os, daily weight  - Renal feeds/diet  - Current medications reviewed. - Nephrotoxic medications have been discontinued. - Dose adjusted and appropriate. - Dose meds for eGFR <60 mL/min/1.73m2    - Avoid heavy opioids due to  renal failure - may use very low dose dilaudid / fentanyl with close monitoring of CNS and respiratory depression. Multiple complex problems. High risk    Discussed with patient, and treatment team ER team   Thank you for allowing me to participate in this patient's care. Please do not hesitate to contact me with any questions/concerns. We will follow along with you. Irene Martin MD       [facility] [address] [place] [address]  Office: [phone] or [address] [employer]  Fax: [phone]              CHIEF COMPLAINT:   Chief Complaint   Patient presents with    Abdominal Pain     x2 week. Pt. reporting paracentesis every other wed.  increased abd. distention over this week and concerned they didn't drain enough fluid off last time     History Obtained From:  patient, electronic medical record , treatment team, ER team,    HPI: Mr. Asaf Caldera is a 44 y.o. male with significant past medical history of end stage renal disease on hemodialysis, Monday Thursday Friday, with our group, history of cirrhosis, likely alcoholic, with recurrent history of ascites, needing biweekly paracentesis,  Did not get much fluid of last paracentesis, now came in to the emergency room with complaints of abdominal pain, worsening, with increased abdominal distention,  WE are alled to continue his ESRD care  Date of last dialysis: Wednesday  Re: ESRD  · Duration (when):

## 2019-08-02 NOTE — ED PROVIDER NOTES
not had symptoms related to cough. He goes on to report he is currently reporting pain and discomfort that is a dull achy pain rating it to be a 7 out of 10. At the present time he denies that he is experiencing substernal chest pain, or palpitations. He denies hematuria or flank pain. He denies dysuria, urgency, frequency. Patient states that he has no additional complaints voiced at the present time. Nursing Notes were all reviewed and agreed with or any disagreements were addressed  in the HPI. REVIEW OF SYSTEMS    (2-9 systems for level 4, 10 or more for level 5)     Review of Systems   Constitutional: Negative for activity change, chills and fever. Respiratory: Positive for shortness of breath. Negative for cough, chest tightness, wheezing and stridor. Cardiovascular: Negative for chest pain, palpitations and leg swelling. Gastrointestinal: Positive for abdominal distention and abdominal pain. Negative for diarrhea, nausea and vomiting. Genitourinary: Negative for dysuria and flank pain. Musculoskeletal: Negative for back pain and myalgias. Skin: Negative for rash. Neurological: Negative for seizures, syncope and headaches. Positives and Pertinent negatives as per HPI. Except as noted abovein the ROS, all other systems were reviewed and negative. PAST MEDICAL HISTORY     Past Medical History:   Diagnosis Date    [facility] ([facility])     Chronic kidney disease     Diabetes mellitus ([facility] [address] [facility]     Hypertension        SURGICAL HISTORY   History reviewed. No pertinent surgical history. CURRENTMEDICATIONS       Previous Medications    ASPIRIN 81 MG CHEWABLE TABLET    Take 1 tablet by mouth daily    ATORVASTATIN (LIPITOR) 20 MG TABLET    Take 1 tablet by mouth nightly    CLOPIDOGREL (PLAVIX) 75 MG TABLET    Take 1 tablet by mouth daily    GABAPENTIN (NEURONTIN) 400 MG CAPSULE    Take 400 mg by mouth 2 times daily.      PANTOPRAZOLE (PROTONIX) 40 MG TABLET Protonix 40 mg tablet,delayed release   Take 1 tablet every day by oral route for 30 days. ZOLPIDEM (AMBIEN) 5 MG TABLET    Take 5 mg by mouth nightly as needed for Sleep. ALLERGIES     Adhesive tape; Morphine and related; Promethazine; Chlorpheniramine-phenylephrine; and Morphine    FAMILYHISTORY     History reviewed. No pertinent family history. SOCIAL HISTORY       Social History     Socioeconomic History    Marital status:      Spouse name: None    Number of children: None    Years of education: None    Highest education level: None   Occupational History    None   Social Needs    Financial resource strain: None    Food insecurity:     Worry: None     Inability: None    Transportation needs:     Medical: None     Non-medical: None   Tobacco Use    Smoking status: Never Smoker    Smokeless tobacco: Never Used   Substance and Sexual Activity    Alcohol use: No    Drug use: No    Sexual activity: None   Lifestyle    Physical activity:     Days per week: None     Minutes per session: None    Stress: None   Relationships    Social connections:     Talks on phone: None     Gets together: None     Attends [employer] service: None     Active member of club or organization: None     Attends meetings of clubs or organizations: None     Relationship status: None    Intimate partner violence:     Fear of current or ex partner: None     Emotionally abused: None     Physically abused: None     Forced sexual activity: None   Other Topics Concern    None   Social History Narrative    None       SCREENINGS             PHYSICAL EXAM    (up to 7 for level 4, 8 or more for level 5)     ED Triage Vitals [08/02/19 0941]   BP Temp Temp Source Pulse Resp SpO2 Height Weight   102/87 98.2 °F (36.8 °C) Infrared 91 18 99 % 5' 10\" (1.778 m) 240 lb (108.9 kg)       Physical Exam   Constitutional: He is oriented to person, place, and time. He appears well-developed and well-nourished.  He is PANEL W/ REFLEX TO MG FOR LOW K - Abnormal; Notable for the following components:    Chloride 95 (*)     Glucose 108 (*)     BUN 40 (*)     CREATININE 7.9 (*)     GFR Non- 7 (*)     GFR  9 (*)     Alb 3.3 (*)     Albumin/Globulin Ratio 0.8 (*)     AST 11 (*)     All other components within normal limits    Narrative:     CALL  [facility] tel. [phone],  Chemistry results called to and read back by emilie coon, 08/02/2019  11:24, by [facility]  Performed at:  [facility] [address], [place], [address] [employer]   Phone [phone] - Abnormal; Notable for the following components:    Pro-BNP 12,934 (*)     All other components within normal limits    Narrative:     Francisco Susan  [facility] tel. [phone],  Chemistry results called to and read back by emilie coon, 08/02/2019  11:24, by [facility]  Performed at:  [facility] [address], [place], [address] [employer]   Phone [phone]   PROTIME-INR - Abnormal; Notable for the following components:    Protime 14.2 (*)     INR 1.25 (*)     All other components within normal limits    Narrative:     Performed at:  [facility] [address],  [place], [address] [employer]   Phone [phone]   CULTURE BLOOD #1   CULTURE BLOOD #2   LIPASE    Narrative:     [place] Susan  [facility] tel. [phone],  Chemistry results called to and read back by emilie coon, 08/02/2019  11:24, by [facility]  Performed at:  [facility] [address],  [place], [address] [employer]   Phone [phone]   APTT    Narrative:     Performed at:  [facility] [address],  [place], [address] [employer]   Phone [phone]   LACTATE, SEPSIS    Narrative:     Performed at:  [facility] [address],  [place], [address] [employer]   Phone [phone]   [facility] All other labs were within normal range or not returned as of this dictation. EKG: All EKG's are interpreted by the Emergency Department Physician in the absence of a cardiologist.  Please see their note for interpretation of EKG. RADIOLOGY:   Non-plain film images such as CT, Ultrasound and MRI are read by the radiologist. Jc Perez radiographic images are visualized andpreliminarily interpreted by the  ED Provider with the below findings:    Interpretation perthe Radiologist below, if available at the time of this note:    CT ABDOMEN PELVIS W IV CONTRAST Additional Contrast? None   Preliminary Result   Moderate to large right-sided pleural effusion with wall thickening and   enhancement of the wall. This relative of the collection is indeterminate. Findings indeterminate but favored to represent a chronic pleural effusion. Please correlate clinically. Consolidation right lung base favored to represent atelectasis. Pneumonia   not excluded. Cirrhosis and evidence of portal hypertension. No focal lesion noted within   the liver. Large amount of ascites within the abdomen. XR CHEST PORTABLE   Final Result   Large right pleural effusion with underlying right lung infiltrate or   atelectasis. Follow-up radiographs to document resolution disease is   warranted. PROCEDURES   Unless otherwise noted below, none     Procedures    CRITICAL CARE TIME   Because of the high probability of sudden clinical deterioration of the patients condition and to prevent further deterioration, my critical care time involved my full attention to the patients condition, and included chart data review, documentation, medication ordering, viewing the patients old records, reevaluation of the patient's cardiac, pulmonary, and neurological status. Reassessing vital signs. Consutlations with off service physician. Ordering, interpreting reviewing diagnostic testing.  Therefore my critical care disease on hemodialysis ([facility] [address]    5.  Portal hypertension ([facility] [address]          DISPOSITION/PLAN   DISPOSITION Decision To Admit 08/02/2019 01:30:03 PM         (Please note that portions ofthis note were completed with a voice recognition program.  Efforts were made to edit the dictations but occasionally words are mis-transcribed.)    Fernando Jim PA-C (electronically signed)              Cony Gonzalez PA-C  08/02/19 2299

## 2019-08-03 NOTE — FLOWSHEET NOTE
Assessment complete. Slightly hypotensive, asymptomatic. Patient resting in bed. Respirations even and easy. Call light in reach. No needs expressed at this time. Will continue to monitor.      Morphine given for pain @ 2237     08/02/19 2231   Vital Signs   Temp 98 °F (36.7 °C)   Temp Source Oral   Pulse 95   Heart Rate Source Monitor   Resp 16   BP (!) 91/53   BP Location Right lower arm   BP Upper/Lower Lower   MAP (mmHg) 66   Patient Position Sitting   Level of Consciousness 0   MEWS Score 2   Patient Currently in Pain Yes   Pain Assessment   Pain Assessment 0-10   Pain Level 7   Pain Type Acute pain   Pain Location Abdomen   Pain Orientation Mid   Oxygen Therapy   SpO2 96 %   Pulse Oximeter Device Mode Intermittent   Pulse Oximeter Device Location Finger   O2 Device None (Room air)

## 2019-08-04 NOTE — PLAN OF CARE
Problem: Pain:  Goal: Pain level will decrease  Description  Pain level will decrease  Outcome: Ongoing  Morphine IV for abdominal pain.

## 2019-08-04 NOTE — FLOWSHEET NOTE
Assessment complete. VSS. Patient resting in bed. Respirations even and easy. Call light in reach. No needs expressed at this time. Will continue to monitor.       08/03/19 2127   Vital Signs   Temp 97.7 °F (36.5 °C)   Temp Source Oral   Pulse 88   Heart Rate Source Monitor   Resp 18   /65   BP Location Right upper arm   BP Upper/Lower Upper   MAP (mmHg) 77   Patient Position Sitting   Level of Consciousness 0   MEWS Score 2   Patient Currently in Pain Yes   Pain Assessment   Pain Assessment 0-10   Pain Level 6   Pain Location Abdomen   Patient's Stated Pain Goal No pain   Oxygen Therapy   SpO2 96 %   Pulse Oximeter Device Mode Intermittent   Pulse Oximeter Device Location Finger   O2 Device None (Room air)

## 2019-08-05 NOTE — PROGRESS NOTES
5800ml of fluid removed  Spoke to patients RN  and advised her the amount of fluid removed and that patient was returning to the floor.
700 ml of fluid removed  Spoke to patients CAMRON garcia and advised her the amount of fluid removed and that patient was returning to the floor.
9.5*  --   --    BUN 53* 39* 51*   CREATININE 9.2* 7.1* 8.1*     Lab Results   Component Value Date    CREATININE 8.1 08/05/2019     Magnesium: No results found for: MG  Medications Reviewed by me   atorvastatin  20 mg Oral Nightly    gabapentin  400 mg Oral BID    pantoprazole  40 mg Oral QAM AC    clopidogrel  75 mg Oral Daily    aspirin  81 mg Oral Daily    docusate sodium  100 mg Oral BID    heparin (porcine)  5,000 Units Subcutaneous 3 times per day    sodium chloride flush  10 mL Intravenous 2 times per day    insulin lispro  0-12 Units Subcutaneous TID     insulin lispro  0-6 Units Subcutaneous Nightly      dextrose             Electronically Signed: Zena Perkins MD 8/5/2019 12:08 PM
results found for: MG  Medications Reviewed by me   atorvastatin  20 mg Oral Nightly    gabapentin  400 mg Oral BID    pantoprazole  40 mg Oral QAM AC    clopidogrel  75 mg Oral Daily    aspirin  81 mg Oral Daily    docusate sodium  100 mg Oral BID    heparin (porcine)  5,000 Units Subcutaneous 3 times per day    sodium chloride flush  10 mL Intravenous 2 times per day    insulin lispro  0-12 Units Subcutaneous TID WC    insulin lispro  0-6 Units Subcutaneous Nightly      dextrose             Electronically Signed: Gm Elias MD 8/4/2019 12:55 PM

## 2019-08-05 NOTE — CARE COORDINATION
Discharge Planning Assessment  RN/SW discharge planner met with patient/(and family member) to discuss reason for admission, current living situation, and potential needs at the time of discharge    Demographics/Insurance verified Yes    Current type of dwelling:house    Living arrangements: w/spouse    Level of function/Support: independent w/all ADL's-active     PCP:[facility]    Last Visit to PCP:stated about 7mos ago-plans on scheduling appt     DME:bilateral prosthetics, walker (stated used mostly in winter), wheelchair used most if needed. Active with any community resources/agencies/skilled home care: stated has home care for Rn for wound care and vitals-cannot remember the name of the agency. Pt called and lmom w/home care nurse to call Sw w/agency name. Medication compliance issues: stated compliant w/meds    Financial issues that could impact healthcare: has HORACIO-stated no issues    Transportation at the time of discharge:self    Tentative discharge plan: Pt stated he anticipates being d/c'd today-will drive himself to dialysis. Stated the still goes to [facility], schedule is MWF at 330pm. Will fax home care agency any info needed to cont servcies. No d/c needs.     Electronically signed by TUCKER Medley on 8/5/2019 at 12:19 PM

## 2019-08-05 NOTE — DISCHARGE SUMMARY
Hospital Medicine Discharge Summary    Patient ID: Ailin Humphrey      Patient's PCP: Anthony Bustamante MD    Admit Date: 8/2/2019     Discharge Date: 8/5/2019    Admitting Physician: Alicja Lockett MD     Discharge Physician: Angel Robles MD     Discharge Diagnoses and Hospital Course: Active Hospital Problems    Diagnosis Date Noted    Ascites [R18.8] 08/02/2019     Abdominal pain and dyspnea 2/2 recurrent ascites.  Underlying history of cirrhosis with recurrent ascites s/p biweekly paracentesis. - S/p IR paracentesis with clinical improvement  - follow up cultures and fluid studies neg for SBP    Moderate R pleural effusion   - s/p IR thoracentesis  - HD per nephro     ESRD on HD MWF  - nephro following for HD   - monitor lytes and volume status     Hyperkalemia 2/2 ESRD  - treatment with HD     Questionable chronic diastolic CHF     PMH of CAD, DM, bilateral BKA, htn    The patient was seen and examined on day of discharge and this discharge summary is in conjunction with any daily progress note from day of discharge. Exam:     /70   Pulse 86   Temp 96.9 °F (36.1 °C) (Oral)   Resp 16   Ht 5' 10\" (1.778 m)   Wt 231 lb 1.6 oz (104.8 kg)   SpO2 94%   BMI 33.16 kg/m²     Gen/overall appearance: Not in acute distress. Alert.   Head: Normocephalic, atraumatic  Eyes: EOMI, no scleral icterus  CVS: regular rate and rhythm, Normal S1S2  Pulm:CTAB,  No crackles/wheezes  Gastrointestinal: no TTP, mildly distended, no guarding or rebound  Extremities: bilat BKA with prosthesis  Neuro: No gross focal deficits noted  Skin: Warm, dry    Consults:     IP CONSULT TO NEPHROLOGY  IP CONSULT TO RADIOLOGY  IP CONSULT TO HOSPITALIST  IP CONSULT TO INTERVENTIONAL RADIOLOGY  IP CONSULT TO NEPHROLOGY    Disposition:  home     Condition:  Stable    Discharge Instructions/Follow-up:   Pt to follow up with PCP within 1 week  Consultants as scheduled    Code Status:  Full Code    Activity: activity as

## 2019-08-05 NOTE — ED NOTES
No cardiac activity noted on ultrasound per Dr. Shadia Magana - will continue with automatic compressor until family arrives. Pt intubated with Clifford airway and being bagged per Respiratory.      Ramu Hinojosa RN  08/05/19 [id number]

## 2019-08-06 NOTE — ED NOTES
Hospital number given to Aure Jules - elise's son.  Will call with  home arrangements tomorrow     Jazmin Lou RN  [phone]

## 2019-08-06 NOTE — ED NOTES
Whit Portillo [address] [place] [address].[address]  [phone] 355 [facility] Erica Ku RN  08/06/19 [address] Lashanda Bui  08/06/19 7021

## 2019-08-07 LAB
BLOOD CULTURE, ROUTINE: NORMAL
CULTURE, BLOOD 2: NORMAL